# Patient Record
Sex: FEMALE | URBAN - NONMETROPOLITAN AREA
[De-identification: names, ages, dates, MRNs, and addresses within clinical notes are randomized per-mention and may not be internally consistent; named-entity substitution may affect disease eponyms.]

---

## 2023-08-31 ENCOUNTER — APPOINTMENT (RX ONLY)
Dept: URBAN - NONMETROPOLITAN AREA CLINIC 13 | Facility: CLINIC | Age: 28
Setting detail: DERMATOLOGY
End: 2023-08-31

## 2023-08-31 DIAGNOSIS — Z41.9 ENCOUNTER FOR PROCEDURE FOR PURPOSES OTHER THAN REMEDYING HEALTH STATE, UNSPECIFIED: ICD-10-CM

## 2023-08-31 PROCEDURE — ? HYDRAFACIAL

## 2023-08-31 NOTE — PROCEDURE: HYDRAFACIAL
Tip: Hydropeel Tip, Clear
Solution: GlySal 15%
Number Of Passes Step 4: 0
Procedure: Extraction
Vacuum Pressure Low Setting (Will Not Render If Set To 0): 16
Tip Override
Number Of Passes Step 5: 1
Procedure: Boost
Solution: Beta-HD
Vacuum Pressure Low Setting (Will Not Render If Set To 0): 22
Procedure: Extend and Protect
Solution Override: none
Solution Override
Price (Use Numbers Only, No Special Characters Or $): 236
Tip: Hydropeel Tip, Blue
Indication: acne
Tip: Hydropeel Tip, Teal
Solution: Antiox-6
Number Of Passes Step 1: 2
Consent: Written consent obtained, risks reviewed including but not limited to crusting, scabbing, blistering, scarring, darker or lighter pigmentary change, bruising, and/or incomplete response.
Procedure: Exfoliation
Solution Override: Clarifying
Post-Care Instructions: I reviewed with the patient in detail post-care instructions. Patient should stay away from the sun and wear sun protection until treated areas are fully healed.
Procedure: Peel
Location: face
Solution: Activ-4

## 2023-09-12 ENCOUNTER — APPOINTMENT (RX ONLY)
Dept: URBAN - NONMETROPOLITAN AREA CLINIC 13 | Facility: CLINIC | Age: 28
Setting detail: DERMATOLOGY
End: 2023-09-12

## 2023-09-12 DIAGNOSIS — Z41.9 ENCOUNTER FOR PROCEDURE FOR PURPOSES OTHER THAN REMEDYING HEALTH STATE, UNSPECIFIED: ICD-10-CM

## 2023-09-12 PROCEDURE — ? COSMETIC CONSULTATION: BOTOX

## 2023-09-12 PROCEDURE — ? BOTOX

## 2023-09-12 ASSESSMENT — LOCATION DETAILED DESCRIPTION DERM
LOCATION DETAILED: RIGHT UPPER CUTANEOUS LIP
LOCATION DETAILED: LEFT UPPER CUTANEOUS LIP

## 2023-09-12 ASSESSMENT — LOCATION SIMPLE DESCRIPTION DERM
LOCATION SIMPLE: RIGHT LIP
LOCATION SIMPLE: LEFT LIP

## 2023-09-12 ASSESSMENT — LOCATION ZONE DERM: LOCATION ZONE: LIP

## 2023-09-12 NOTE — PROCEDURE: BOTOX
Show Topical Anesthesia: Yes
R Brow Units: 0
Comments: Patient encouraged to come back in 2 weeks for touch up if needed.\\n\\n Patient agrees with plan and verbalizes understanding.
Additional Area 2 Location: Upper [place]-Oral Lines
Show Right And Left Periorbital Units: No
Additional Area 5 Location: Infraorbital eyelids
Additional Area 2 Units: 4
Expiration Date (Month Year): 12/2025
Additional Area 1 Location: Lateral brow lift
Incrementing Botox Units: By 0.5 Units
Price (Use Numbers Only, No Special Characters Or $): 52
Additional Area 4 Location: Right forehead
Consent obtained through EMA charting. Risks include but not limited to lid/brow ptosis, bruising, swelling, diplopia, temporary effect, incomplete chemical denervation.\\n\\nBotox administered per written protocol per Key Hampton MD.\\n\\nPhotos taken prior to injections.
Lot #: [id number]
Detail Level: Detailed
Additional Area 3 Location: Brow Lift
Post-Care Instructions: Instructed to not lie down for 4 hours, do not massage areas injected, avoid headbands/hats if forehead was treated, and limit physical activity and extreme heat for 24 hours.
Additional Area 6 Location: Jemma lift